# Patient Record
Sex: MALE | Race: WHITE | NOT HISPANIC OR LATINO | Employment: UNEMPLOYED | URBAN - METROPOLITAN AREA
[De-identification: names, ages, dates, MRNs, and addresses within clinical notes are randomized per-mention and may not be internally consistent; named-entity substitution may affect disease eponyms.]

---

## 2020-07-08 NOTE — PROGRESS NOTES
"[facility] Surgery Consultation    PCP:  Lucio Smith [phone]    HPI: Patient is a 49 year old male who is seen in consultation, referred by   who presents with a recurrent right inguinal hernia. He reports he had a large inguinal hernia when he was 15 or 16 years old and had it fixed at that time. He did not notice a recurrence. His primary care provider, Dr. Smith, identified hernia recurrence on his physical exam. He denies any issues with pain at the site. He denies issues with chronic constipation, chronic cough or heavy lifting. He also reports he has had issues with biliary colic. He has had three episodes now of severe right upper quadrant pain. The first in December 2019 and then again later in the month and again about 1 month ago. The episodes have typically began in the early morning (2am) and awoken him from pain. He was seen in the ER for the first two. An abdominal US was completed and revealed cholelithiasis per his report, no wall thickening or pericholecystic fluid. He has not noticed a relationship between the episodes and food. The pain radiated to his back. He did not have associated nausea or emesis.      PMH:  none  PSH:   Right inguinal hernia repair, vasectomy  Social History:   no tobacco, works at an office  Family History:  reviewed  Medications/Allergies: Home medications and allergies reviewed.    ROS:  The 10 point Review of Systems is negative other than noted in the HPI.    Physical Exam:  /60   Pulse 69   Ht 1.905 m (6' 3\")   Wt 82.1 kg (181 lb)   BMI 22.62 kg/m    GENERAL: Generally appears well.  Psych: Alert and Oriented.  Normal affect  Eyes: Sclera clear  Respiratory:  Lungs clear to ausculation bilaterally with good air excursion  Cardiovascular:  Regular Rate and Rhythm with no murmurs gallops or rubs, normal peripheral pulses  GI: Abdomen soft, nontender, nondistended, no hernias, normal bowel sounds, no hepatosplenomegaly. "   Groin- I examined the patient in both the standing and supine positions. Right Groin- Moderate sized inguinal hernia, reducible. Left Groin- No hernia Palpated. No scrotal or testicle abnormalities.  Lymphatic/Hematologic/Immune:  No femoral or cervical lymphadenopathy.  Integumentary:  No rashes  Neurological: grossly intact     All new lab and imaging data was reviewed.     Impression and Plan:  Patient is a 49 year old male with recurrent right inguinal hernia and biliary colic. We discussed the option of both laparoscopic right inguinal hernia repair and laparoscopic cholecystectomy. He prefers not to have his gallbladder removed if possible. To have both procedures concurrently does have a slight increased risk of infection and I would recommend that we repair the hernia first and if he has further episodes of biliary colic - to later remove his gallbladder. He is in agreement with this plan. We discussed adhering to a low fat diet.     PLAN: laparoscopic right inguinal hernia repair.   I discussed the pathophysiology of hernias and options for repair including laparoscopic VS open.  The risks associated with the procedure including, but not limited to, recurrence, nerve entrapment or injury, persistence of pain, injury to the bowel/bladder, infertility, hematoma, mesh migration, mesh infection, MI, and PE were discussed with the patient. He indicated understanding of the discussion, asked appropriate questions, and provided consent. Signs and symptoms of incarceration were discussed. If these develop in the interim, he promises to call or go straight to the ER. I have provided the patient with an information pamphlet.  It was my pleasure to participate in the care of Norm Longo in clinic today. Thank you for this consultation.       Ting Bonner MD  [facility]  [phone]    Please route or send letter to:  Primary Care Provider (PCP) and Referring Provider

## 2020-07-10 ENCOUNTER — HOSPITAL ENCOUNTER (OUTPATIENT)
Facility: CLINIC | Age: 50
End: 2020-07-10
Attending: SURGERY | Admitting: SURGERY
Payer: COMMERCIAL

## 2020-07-10 ENCOUNTER — PREP FOR PROCEDURE (OUTPATIENT)
Dept: SURGERY | Facility: CLINIC | Age: 50
End: 2020-07-10

## 2020-07-10 ENCOUNTER — OFFICE VISIT (OUTPATIENT)
Dept: SURGERY | Facility: CLINIC | Age: 50
End: 2020-07-10
Payer: COMMERCIAL

## 2020-07-10 VITALS
WEIGHT: 181 LBS | DIASTOLIC BLOOD PRESSURE: 60 MMHG | SYSTOLIC BLOOD PRESSURE: 128 MMHG | HEIGHT: 75 IN | HEART RATE: 69 BPM | BODY MASS INDEX: 22.5 KG/M2

## 2020-07-10 DIAGNOSIS — K40.90 RIGHT INGUINAL HERNIA: Primary | ICD-10-CM

## 2020-07-10 DIAGNOSIS — Z11.59 ENCOUNTER FOR SCREENING FOR OTHER VIRAL DISEASES: Primary | ICD-10-CM

## 2020-07-10 DIAGNOSIS — K40.90 RIGHT INGUINAL HERNIA: ICD-10-CM

## 2020-07-10 PROCEDURE — 99204 OFFICE O/P NEW MOD 45 MIN: CPT | Performed by: SURGERY

## 2020-07-10 RX ORDER — SILDENAFIL 25 MG/1
25 TABLET, FILM COATED ORAL DAILY PRN
COMMUNITY

## 2020-07-10 ASSESSMENT — MIFFLIN-ST. JEOR: SCORE: 1781.64

## 2020-07-11 ENCOUNTER — TELEPHONE (OUTPATIENT)
Dept: SURGERY | Facility: PHYSICIAN GROUP | Age: 50
End: 2020-07-11

## 2020-07-11 NOTE — TELEPHONE ENCOUNTER
Type of surgery: Laparoscopic right inguinal hernia repair with mesh  Location of surgery: [facility]  Date and time of surgery: 9/4/20 at 10:30am  Surgeon: Dr. Ting Bonner  Pre-Op Appt Date: Patient to schedule  Post-Op Appt Date: Patient to schedule   Packet sent out: Yes  Pre-cert/Authorization completed:  Not Applicable  Date: 7/11/20      Surgery needed to be changed, per insurance needs to be done at surgical center    Surgery scheduled 9/18/20 with Dr Norwood at 7:30 am at the [facility] [place]

## 2020-08-20 RX ORDER — CEFAZOLIN SODIUM 2 G/100ML
2 INJECTION, SOLUTION INTRAVENOUS
Status: CANCELLED | OUTPATIENT
Start: 2020-08-20

## 2020-08-20 RX ORDER — CEFAZOLIN SODIUM 1 G/3ML
1 INJECTION, POWDER, FOR SOLUTION INTRAMUSCULAR; INTRAVENOUS SEE ADMIN INSTRUCTIONS
Status: CANCELLED | OUTPATIENT
Start: 2020-08-20

## 2020-09-17 ENCOUNTER — OFFICE VISIT (OUTPATIENT)
Dept: SURGERY | Facility: CLINIC | Age: 50
End: 2020-09-17
Payer: COMMERCIAL

## 2020-09-17 VITALS
HEIGHT: 75 IN | OXYGEN SATURATION: 98 % | TEMPERATURE: 97.7 F | SYSTOLIC BLOOD PRESSURE: 118 MMHG | DIASTOLIC BLOOD PRESSURE: 58 MMHG | WEIGHT: 181 LBS | HEART RATE: 67 BPM | BODY MASS INDEX: 22.5 KG/M2 | RESPIRATION RATE: 12 BRPM

## 2020-09-17 DIAGNOSIS — Z01.818 PREOP GENERAL PHYSICAL EXAM: Primary | ICD-10-CM

## 2020-09-17 PROCEDURE — 99214 OFFICE O/P EST MOD 30 MIN: CPT | Performed by: PHYSICIAN ASSISTANT

## 2020-09-17 ASSESSMENT — MIFFLIN-ST. JEOR: SCORE: 1781.64

## 2020-09-17 NOTE — PROGRESS NOTES
[facility] Preoperative History & Physical     Norm Longo MRN# [id number]   YOB: 1970 Age: 49 year old     Date of Surgery: 9/18/20  Surgeon: Jarrod Norwood MD  Primary care provider: Lucio Smith    PREOP DIAGNOSIS: Recurrent right inguinal hernia    PLANNED SURGICAL PROCEDURE: Laparoscopic right inguinal hernia repair, possible bilateral    HISTORY OF PRESENT ILLNESS:   This patient is a 49 year old male who presents for preoperative evaluation for the above-mentioned surgical procedure. The patient already had a preoperative H&P with his primary care provider on 8/18/20. Unfortunately this H&P is not within the 30-day window for his upcoming surgery, so he is here for an updated H&P. I have reviewed his previous H&P including the CBC which was within normal limits.   Today patient denies any change in his health or medical history recently. He denies any personal or family history of bleeding disorders, anesthesia problems, or sleep apnea. He does report some issues with insomnia, but no snoring. He denies previous heart attack, symptoms of angina, heart failure, severe hypertension, and peripheral artery disease. He is able to climb a flight of stairs and walk 4 blocks without difficulty. He walks and hikes regularly for exercise.    PAST MEDICAL HISTORY:   - Cat allergies  - ED (erectile dysfunction)  - Eosinophilic esophagitis, found on upper GI endoscopy (per patient, no longer on omeprazole for this)  - GERD (not taking any medications, managed by primary care provider)  - Basal cell carcinoma of trunk s/p excision    Negative for problems with anesthesia, bleeding or clotting disorders   Nutritional status: healthy, well-nourished, active     PAST SURGICAL HISTORY:   Past Surgical History:   Procedure Laterality Date     ENT SURGERY  2017    deviated septum     VASECTOMY       HERNIA REPAIR  1986       SOCIAL HISTORY:  Social History     Tobacco Use     Smoking status:  "Never Smoker     Smokeless tobacco: Never Used   Substance Use Topics     Alcohol use: Not Currently        FAMILY HISTORY:   Family History   Problem Relation Age of Onset     Diabetes Father      Hypertension Maternal Grandfather        Family history of anesthesia reaction: No  Family history of MI < 51 yo: No  Family history of bleeding/clotting disorder: No        ALLERGIES:  No known drug allergies  Patient does have a cat allergy (swelling).     MEDICATIONS:  Sildenafil (Viagra) 25mg by mouth daily as needed    REVIEW OF SYSTEMS:  Brief ROS is negative other than noted in the HPI.  Constitutional: NEGATIVE for fever, chills, change in weight  Respiratory: NEGATIVE for significant cough or SOB  Cardiovascular: NEGATIVE for chest pain, palpitations or peripheral edema  GI: NEGATIVE for nausea, vomiting, abdominal pain, heartburn, or change in bowel habits  H: NEGATIVE for bleeding problems     PHYSICAL EXAM:  /58 (BP Location: Right arm, Patient Position: Sitting, Cuff Size: Adult Regular)   Pulse 67   Temp 97.7  F (36.5  C) (Oral)   Resp 12   Ht 1.905 m (6' 3\")   Wt 82.1 kg (181 lb)   SpO2 98%   BMI 22.62 kg/m     General - This is a well developed, well nourished male in no apparent distress, appearing stated age  Head - normocephalic, atraumatic, normal hair pattern  Eyes - sclera white, conjunctiva clear, pupils equally round and reactive to light bilaterally, extraocular motions intact  Ears - external ears normal in appearance. Unable to perform more extensive ear exam due to no otoscope available in our office  Nose and throat - not examined. No complaints, did not remove patient mask.  Respiratory: lungs clear to auscultation bilaterally without wheezes, rales or rhonchi   Cardiovascular: regular rate and rhythm; S1 and S2 distinct without murmur   Abdomen - Soft, nontender, nondistended with +bowel sounds, no masses or organomegaly  Extremities - Moves all extremities. Warm without edema. " No calf tenderness. Palpable PT and DP pulses  Neurologic - Nonfocal, cranial nerves II-XII grossly intact    IMPRESSION/PLAN  I recommended to the patient that he consider consultation with a sleep clinic for his insomnia if needed. He will follow-up with his primary care provider as directed for routine medical care.    No additional laboratory testing is indicated prior to his surgery. CBC reviewed and within normal limits. No active medical problems that need to be addressed at this time. The patient is diagnostically and therapeutically optimized for planned procedure.  He will proceed with laparoscopic right inguinal hernia repair tomorrow with Dr. Norwood.       Nadia Buitrago PA-C  [facility]  [phone]

## 2020-09-18 ENCOUNTER — TRANSFERRED RECORDS (OUTPATIENT)
Dept: HEALTH INFORMATION MANAGEMENT | Facility: CLINIC | Age: 50
End: 2020-09-18

## 2020-09-18 ENCOUNTER — APPOINTMENT (OUTPATIENT)
Dept: SURGERY | Facility: PHYSICIAN GROUP | Age: 50
End: 2020-09-18
Payer: COMMERCIAL

## 2020-09-18 PROCEDURE — 49650 LAP ING HERNIA REPAIR INIT: CPT | Mod: AS | Performed by: PHYSICIAN ASSISTANT

## 2020-09-18 PROCEDURE — 49651 LAP ING HERNIA REPAIR RECUR: CPT | Mod: RT | Performed by: SURGERY

## 2020-09-18 PROCEDURE — 49650 LAP ING HERNIA REPAIR INIT: CPT | Mod: LT | Performed by: SURGERY

## 2020-09-18 PROCEDURE — 49651 LAP ING HERNIA REPAIR RECUR: CPT | Mod: AS | Performed by: PHYSICIAN ASSISTANT

## 2020-09-25 ENCOUNTER — TELEPHONE (OUTPATIENT)
Dept: SURGERY | Facility: CLINIC | Age: 50
End: 2020-09-25

## 2020-09-25 NOTE — TELEPHONE ENCOUNTER
Procedure:  Right inguinal hernia repair    Date: 9/18/2020    Surgeon:  Cuco    Patient reporting egg sized lump in right sac.  Tender.  Has not gone on any exercise walks because of the discomfort, but no issues with any other ADLs.    Informed him that it is not unusual to be experiencing this symptoms for the first couple weeks after surgery.  After two weeks the symptoms should start to subside, maybe slowly.  He should not have any increasing pain at this point.    Encouraged continued use of boxer briefs, ice, ibuprofen, and elevation and he should call with any worsening symptoms.    If symptoms do not begin to resolve after two weeks, he should call to discuss and will probably be scheduled for in person post operative visit.    He verbalized understanding and agreed. Will call PRN.    Adela Nunez RN-BSN

## 2020-10-02 ENCOUNTER — TELEPHONE (OUTPATIENT)
Facility: CLINIC | Age: 50
End: 2020-10-02

## 2020-10-02 NOTE — TELEPHONE ENCOUNTER
[facility]  Post op call note - LAPAROSCOPIC INGUINAL HERNIA REPAIR  October 2, 2020       Norm Longo was called for an update regarding his recovery.  He underwent a laparoscopic inguinal hernia repair by Dr. Norwood on 9/18.  Today he tells me he is doing well and denies any complaints.  [facility] 9[facility]25 for swelling in right scrotum.  He reports since he called the swelling has gone down a great amount.  He is no longer taking anything for pain and states he only has pain when he is moving without wearing supportive briefs.  He is eating a normal diet and his bowels are regular.   The patient states he is slowly resuming normal activity and has been going on walks.  He states his wounds are healing well and the steri strips are off.  He denies any erythema or drainage at his wounds.  The patient denies fever/chills, n/v/d, abdominal pain, changes in BM, or wound concerns.     He was instructed to continue to keep wounds clean.  He was advised to advance his activity as tolerated with no heavy lifting > 20 lbs or strenuous exercise x 2 weeks.  The patient states all of his questions were answered and he understands our discussion.  He agrees to follow up as needed and to call our office with any concerns.      Serina Peres PA-C  [facility]  [phone]        Please route or send letter to:  Primary Care Provider (PCP)